# Patient Record
Sex: FEMALE | Race: OTHER | RURAL
[De-identification: names, ages, dates, MRNs, and addresses within clinical notes are randomized per-mention and may not be internally consistent; named-entity substitution may affect disease eponyms.]

---

## 2020-08-25 NOTE — PATIENT DISCUSSION
- Discussed that some glare may come from these old incisions, but should not change over time. Also discussed that vision may fluctuate throughout the day.

## 2020-08-25 NOTE — PATIENT DISCUSSION
08/25/2020[phone]&nbsp;SN &nbsp; &nbsp; slr

## 2021-10-25 NOTE — PATIENT DISCUSSION
Discussed  that some glare may come from these old incisions, but should not  change over time. Also discussed that vision may fluctuate throughout  the day.

## 2021-11-10 NOTE — PATIENT DISCUSSION
Toric [facility] Rx dispensed today. Slight change OU from last year (didn't require toric OD last year). Advised to use trials for a few days prior to ordering. Follow up in 1 year.

## 2022-01-19 NOTE — PATIENT DISCUSSION
Operculated hole OS Patient position supine Patient prepped and draped per unit standard.    Safety straps applied:N/A

## 2022-04-26 ENCOUNTER — NEW PATIENT (OUTPATIENT)
Dept: RURAL CLINIC 3 | Facility: CLINIC | Age: 57
End: 2022-04-26

## 2022-04-26 DIAGNOSIS — H52.4: ICD-10-CM

## 2022-04-26 PROCEDURE — S0620 ROUTINE OPHTHALMOLOGICAL EXA: HCPCS

## 2022-04-26 ASSESSMENT — TONOMETRY
OS_IOP_MMHG: 14
OD_IOP_MMHG: 14

## 2022-04-26 ASSESSMENT — VISUAL ACUITY
OD_SC: 20/20-2
OS_SC: 20/20-2